# Patient Record
(demographics unavailable — no encounter records)

---

## 2024-10-14 NOTE — REVIEW OF SYSTEMS
[Feeling Fatigued] : not feeling fatigued [SOB] : no shortness of breath [Dyspnea on exertion] : not dyspnea during exertion [Chest Discomfort] : no chest discomfort [Lower Ext Edema] : no extremity edema [Palpitations] : palpitations [Syncope] : no syncope [Dizziness] : no dizziness

## 2024-10-14 NOTE — CARDIOLOGY SUMMARY
[de-identified] : 4/13/2023 normal sinus rhythm  72 bpm, QTc 424 7/6/2023 sinus rhythm 65, poor R wave progression, QTc 404 1/10/2024 Sinus 80 poor R wave progression, QTc 427 4/18/24 Sinus 87, normal axis, low voltage with poor R-wave, QTc 384 10/14/2024: NSR, LAE, QTc 418 [de-identified] : 4/04/2023 at [facility] TTE  LVEF 55%, mild cLVH, trace MR [de-identified] :   4/4/2023 CCTA at [facility]:  normal coronaries, calcium score zero [de-identified] : 4/18/2023 US Duplex Renal: Normal right renal artery, left  renal arteries < 60% stenosis, normal aorta no significant stenosis

## 2024-10-14 NOTE — DISCUSSION/SUMMARY
[FreeTextEntry1] : 53F with a family history of premature CAD (Father CAD in his 40's), history of pre-eclampsia with first pregnancy only, normotensive on 2 subsequent pregnancies, with Hashimoto's, GERD, HLD, presented to [facility] ER on 4/4/2023 with chest tightness and SOB, /112 mmHg. Pt reported worsening exertional dyspnea x 2 months especially with stairs.  Troponin negative x 2, BNP 67, TSH WNL. Pt underwent TTE  LVEF 55%, mild LVH, trace MR, CTA 4/4/2023 revealed normal coronaries, calcium score zero. Discharged on Amlodipine 5 mg daily. BP was poorly controlled, PCP increased Amlodipine to 10 mg, pt developed LE edema, Amlodipine was decreased to 5 mg and Valsartan was increased 320 mg daily, last seen 1/2024 added HCTZ but Na 131 then discontinued and increased amlodipine to 7.5mg, presents for routine follow-up.  Assessment/ Plan: BP mildly elevated today on current agents (Presently taking Amlodipine 5mg daily and Valsartan 320mg daily), no prior LE edema with amlodipine 7.5mg dose, repeat EKG with chronic poor R-wave progression.   1. HTN- continue amlodipine 5mg and valsartan 320mg daily. Advised to restart monitoring BP at home, call if persistently elevated and would increase Amlodipine back to 7.5mg daily. Continue low sodium diet. Encouraged routine exercise and healthy dieting.    2. HLD- LDL 85 in April 2024. On Atorvastatin 40mg daily.   3. Obesity- continue dieting and weight loss  4. Hypothyroid- on levothyroxine  Follow up in 6 months, sooner if needed. Patient was advised to contact the office for any new, worsening or concerning symptoms. Patient verbalized understanding and is in agreement with the above plan.  Rhea Ballesteros was present in office at the time of visit. [EKG obtained to assist in diagnosis and management of assessed problem(s)] : EKG obtained to assist in diagnosis and management of assessed problem(s)

## 2024-10-14 NOTE — HISTORY OF PRESENT ILLNESS
[FreeTextEntry1] : 54F with a family history of premature CAD (Father CAD in his 40's), history of pre-eclampsia with first pregnancy only, normotensive on 2 subsequent pregnancies, with Hashimoto's, GERD, HLD, presented to [facility] ER on 4/4/2023 with chest tightness and SOB, /112 mmHg. Pt reported worsening exertional dyspnea x 2 months especially with stairs.  Troponin negative x 2, BNP 67, TSH WNL. Pt underwent TTE  LVEF 55%, mild LVH, trace MR, CTA 4/4/2023 revealed normal coronaries, calcium score zero. Discharged on Amlodipine 5 mg daily. BP was poorly controlled, PCP increased Amlodipine to 10 mg, pt developed LE edema, Amlodipine was decreased to 5 mg and Valsartan was increased 320 mg daily, last seen 1/2024 added HCTZ but Na 131 then discontinued and increased amlodipine to 7.5mg, presents for routine follow-up.  Reports feeling overall well. Reports occasional palpitations, describes as a fast beat, takes a deep breath and it resolves, often occurring when busy at work. Denies chest pain, dyspnea, lightheadedness, dizziness, fatigue, syncope, near syncope and LE edema. Denies smoking, excessive alcohol and illicit drug use. She walks constantly at work, otherwise not participating in any other routine exercise.   Prior visit 4/18/2024: Reports feeling well, home -120s did not have swelling on amlodipine 7.5mg, when taking HCTZ with SBP 100s made her dizzy, had recent hyperthyroid flare since reduced levothyroxine dose by PCP. Getting PT for joint/neck pain, also pending to see rheumatology.  Works as a teacher walking around constantly.    Prior visit 1/2024:  Pt reports feeling well, however she has gained 10 pounds since last visit in July BP is elevated today, not following at home , she is following a low sodium diet but eating more and eating the wrong foods walks 30 min on the treadmill  2 days week- knows she has to exercise more Asymptomatic- Denies chest pain, SOB/GLEZ, PND, orthopnea, LE edema, palpitations, lightheadedness, syncope Follows with PCP Dr Contreras - TFT's controlled and last checked 2 months ago  Had LE edema with Amlodipine 10 mg, gets intermittent mild Edema after standing all day    Prior visit 7/6/2023 She is feeling well Was getting good readings at home, sometimes thought gets too much anxiety regarding home BP monitoring, SBP up to 150 with anxiety-had to stop monitoring Has not lost weight but walking on the treadmill, and lifting light weights, no longer feeling any GLEZ Denies chest pain, SOB/GLEZ, PND, orthopnea, LE edema, palpitations, lightheadedness, syncope    prior visit  4/27/2023 Pt presents today for f/u of HTN Renal duplex showed no KARINA BP at home has not been optimal, typically 's -159's PCP increase Amlodipine to 10 mg daily,but pt c/o LE edema Noticed BP spike after  a salty meal, still has intermittent headaches and at times noticed GLEZ after stairs- this is when BP is high Denies chest pain, SOB, PND, orthopnea, palpitations, lightheadedness, syncope Had some dizziness until she moved Valsartan to nighttime dosing Pt has purchased a new [employer] BP machine which was checked with PCP and is accurate  PCP has ordered a sleep study She has not started exercising,  joined [employer], wants to lose 30-40 pounds     Prior visit 4/13/2023 Pt presents for hospital discharge follow-up Pt saw PCP  Dr Contreras, 4/6/2023, BP was elevated /100 mmHg, PCP changed Amlodipine 5 mg to Valsartan, no real change in BP, stopped checking BP at home as her BP machine was inaccurate as check by PCP- will be purchasing a new one   Pt reports an episodes of chest tightness while laying in bed on 4/10, lasted a few minutes and resolved with meditation, denies dyspnea . Was away last weekend, ate out- non compliant with low sodium diet,  has been feeling some episodes of lightheadedness,  Reports intermittent headaches since summer 2022, had been caring for her mother with dementia who passed away in February, stopped exercising, gained 20 pounds, in menopause, had slowly been getting more progressively short of breath over several months.  Pt denies any TIA symptoms, denies LE edema, syncope

## 2024-11-11 NOTE — HISTORY OF PRESENT ILLNESS
[FreeTextEntry1] : 53 YO  p2  PT HERE ANNUAL EXAM had atypical polyp colon removed recently, will do year fu.  meds- amlodipine, atorvastatin, vit d , omeprazole, valsartan, levothyroxine hashimotos lmp 2 yrs ago.  p3, 12, 21, 24/  co dyspareunia

## 2025-04-14 NOTE — DISCUSSION/SUMMARY
[FreeTextEntry1] : 54F h/o obesity (BMI 32) and with a family history of premature CAD (Father CAD in his 40's), history of pre-eclampsia with first pregnancy only, normotensive on 2 subsequent pregnancies, with Hashimoto's, GERD, HLD, presented to [facility] ER on 4/4/2023 with chest tightness and SOB, /112 mmHg. Pt reported worsening exertional dyspnea x 2 months especially with stairs.  Troponin negative x 2, BNP 67, TSH WNL. Pt underwent TTE  LVEF 55%, mild LVH, trace MR, CTA 4/4/2023 revealed normal coronaries, calcium score zero. Discharged on Amlodipine 5 mg daily. BP was poorly controlled, PCP increased Amlodipine to 10 mg, pt developed LE edema, Amlodipine was decreased to 5 mg and Valsartan was increased 320 mg daily, prior visit 1/2024 added HCTZ but Na 131 then discontinued and increased amlodipine to 7.5mg, last seen 10/2024, presents for routine follow-up.  BP not at goal advised to increase amlodipine to 7.5mg if still not achieving <140/90 then increase further to 10mg, continue valsartan, repeat EKG normal and no anginal complains. Needs weight loss unable to do cardio exercise due to chronic joints pain, obtain sleep study to confirm if KRISTEN would be indicated for Zepbound use.   1. HTN- increase amlodipine to 7.5mg and valsartan 320mg daily.  Continue low sodium diet. Encouraged routine exercise and healthy dieting.    2. HLD- LDL 85 in April 2024. On Atorvastatin 40mg every other day due to joints pain, consider alternative statin if still bothersome.   3. Obesity- continue dieting and weight loss. Pending sleep study for suspected sleep apnea.  4. Hypothyroid- on levothyroxine   Follow up in 6 months. [EKG obtained to assist in diagnosis and management of assessed problem(s)] : EKG obtained to assist in diagnosis and management of assessed problem(s)

## 2025-04-14 NOTE — HISTORY OF PRESENT ILLNESS
[FreeTextEntry1] : 54F h/o obesity (BMI 32) and with a family history of premature CAD (Father CAD in his 40's), history of pre-eclampsia with first pregnancy only, normotensive on 2 subsequent pregnancies, with Hashimoto's, GERD, HLD, presented to [facility] ER on 4/4/2023 with chest tightness and SOB, /112 mmHg. Pt reported worsening exertional dyspnea x 2 months especially with stairs.  Troponin negative x 2, BNP 67, TSH WNL. Pt underwent TTE  LVEF 55%, mild LVH, trace MR, CTA 4/4/2023 revealed normal coronaries, calcium score zero. Discharged on Amlodipine 5 mg daily. BP was poorly controlled, PCP increased Amlodipine to 10 mg, pt developed LE edema, Amlodipine was decreased to 5 mg and Valsartan was increased 320 mg daily, prior visit 1/2024 added HCTZ but Na 131 then discontinued and increased amlodipine to 7.5mg, last seen 10/2024, presents for routine follow-up.  Reports feeling anxious at times as with home /90s but did not increase amlodipine to 7.5mg still taking 5mg dosing, frustrated about not able to lose weight but not doing cardio exercise due to chronic joints pain, taking atorvastatin every other day, had lab work done >6 months ago, waking up feeling exhausted everyday and told by her  with snoring.    Prior visit 10/2024: Reports feeling overall well. Reports occasional palpitations, describes as a fast beat, takes a deep breath and it resolves, often occurring when busy at work. Denies chest pain, dyspnea, lightheadedness, dizziness, fatigue, syncope, near syncope and LE edema. Denies smoking, excessive alcohol and illicit drug use. She walks constantly at work, otherwise not participating in any other routine exercise.   Prior visit 4/18/2024: Reports feeling well, home -120s did not have swelling on amlodipine 7.5mg, when taking HCTZ with SBP 100s made her dizzy, had recent hyperthyroid flare since reduced levothyroxine dose by PCP. Getting PT for joint/neck pain, also pending to see rheumatology.  Works as a teacher walking around constantly.    Prior visit 1/2024:  Pt reports feeling well, however she has gained 10 pounds since last visit in July BP is elevated today, not following at home , she is following a low sodium diet but eating more and eating the wrong foods walks 30 min on the treadmill  2 days week- knows she has to exercise more Asymptomatic- Denies chest pain, SOB/GLEZ, PND, orthopnea, LE edema, palpitations, lightheadedness, syncope Follows with PCP Dr Contreras - TFT's controlled and last checked 2 months ago  Had LE edema with Amlodipine 10 mg, gets intermittent mild Edema after standing all day    Prior visit 7/6/2023 She is feeling well Was getting good readings at home, sometimes thought gets too much anxiety regarding home BP monitoring, SBP up to 150 with anxiety-had to stop monitoring Has not lost weight but walking on the treadmill, and lifting light weights, no longer feeling any GLEZ Denies chest pain, SOB/GLEZ, PND, orthopnea, LE edema, palpitations, lightheadedness, syncope    prior visit  4/27/2023 Pt presents today for f/u of HTN Renal duplex showed no KARINA BP at home has not been optimal, typically 's -159's PCP increase Amlodipine to 10 mg daily,but pt c/o LE edema Noticed BP spike after  a salty meal, still has intermittent headaches and at times noticed GLEZ after stairs- this is when BP is high Denies chest pain, SOB, PND, orthopnea, palpitations, lightheadedness, syncope Had some dizziness until she moved Valsartan to nighttime dosing Pt has purchased a new [employer] BP machine which was checked with PCP and is accurate  PCP has ordered a sleep study She has not started exercising,  joined [employer], wants to lose 30-40 pounds     Prior visit 4/13/2023 Pt presents for hospital discharge follow-up Pt saw PCP  Dr Contreras, 4/6/2023, BP was elevated /100 mmHg, PCP changed Amlodipine 5 mg to Valsartan, no real change in BP, stopped checking BP at home as her BP machine was inaccurate as check by PCP- will be purchasing a new one   Pt reports an episodes of chest tightness while laying in bed on 4/10, lasted a few minutes and resolved with meditation, denies dyspnea . Was away last weekend, ate out- non compliant with low sodium diet,  has been feeling some episodes of lightheadedness,  Reports intermittent headaches since summer 2022, had been caring for her mother with dementia who passed away in February, stopped exercising, gained 20 pounds, in menopause, had slowly been getting more progressively short of breath over several months.  Pt denies any TIA symptoms, denies LE edema, syncope

## 2025-04-14 NOTE — DISCUSSION/SUMMARY
[FreeTextEntry1] : 54F h/o obesity (BMI 32) and with a family history of premature CAD (Father CAD in his 40's), history of pre-eclampsia with first pregnancy only, normotensive on 2 subsequent pregnancies, with Hashimoto's, GERD, HLD, presented to [facility] ER on 4/4/2023 with chest tightness and SOB, /112 mmHg. Pt reported worsening exertional dyspnea x 2 months especially with stairs.  Troponin negative x 2, BNP 67, TSH WNL. Pt underwent TTE  LVEF 55%, mild LVH, trace MR, CTA 4/4/2023 revealed normal coronaries, calcium score zero. Discharged on Amlodipine 5 mg daily. BP was poorly controlled, PCP increased Amlodipine to 10 mg, pt developed LE edema, Amlodipine was decreased to 5 mg and Valsartan was increased 320 mg daily, prior visit 1/2024 added HCTZ but Na 131 then discontinued and increased amlodipine to 7.5mg, last seen 10/2024, presents for routine follow-up.  BP not at goal advised to increase amlodipine to 7.5mg if still not achieving <140/90 then increase further to 10mg, continue valsartan, repeat EKG normal and no anginal complains. Needs weight loss unable to do cardio exercise due to chronic joints pain, obtain sleep study to confirm if KRISTEN would be indicated for Zepbound use.   1. HTN- increase amlodipine to 7.5mg and valsartan 320mg daily.  Continue low sodium diet. Encouraged routine exercise and healthy dieting.    2. HLD- LDL 85 in April 2024. On Atorvastatin 40mg every other day due to joints pain, consider alternative statin if still bothersome.   3. Obesity- continue dieting and weight loss. Pending sleep study for suspected sleep apnea.  4. Hypothyroid- on levothyroxine   Follow up in 6 months. [EKG obtained to assist in diagnosis and management of assessed problem(s)] : EKG obtained to assist in diagnosis and management of assessed problem(s)

## 2025-04-14 NOTE — CARDIOLOGY SUMMARY
[de-identified] : 4/13/2023 normal sinus rhythm  72 bpm, QTc 424 7/6/2023 sinus rhythm 65, poor R wave progression, QTc 404 1/10/2024 Sinus 80 poor R wave progression, QTc 427 4/18/24 Sinus 87, normal axis, low voltage with poor R-wave, QTc 384 10/14/2024: NSR, LAE, QTc 418 4/14/25: Sinus 77 normal axis, no ST changes, QTc 433 [de-identified] : 4/04/2023 at [facility] TTE  LVEF 55%, mild cLVH, trace MR [de-identified] :   4/4/2023 CCTA at [facility]:  normal coronaries, calcium score zero [de-identified] : 4/18/2023 US Duplex Renal: Normal right renal artery, left  renal arteries < 60% stenosis, normal aorta no significant stenosis

## 2025-04-14 NOTE — HISTORY OF PRESENT ILLNESS
[FreeTextEntry1] : 54F h/o obesity (BMI 32) and with a family history of premature CAD (Father CAD in his 40's), history of pre-eclampsia with first pregnancy only, normotensive on 2 subsequent pregnancies, with Hashimoto's, GERD, HLD, presented to [facility] ER on 4/4/2023 with chest tightness and SOB, /112 mmHg. Pt reported worsening exertional dyspnea x 2 months especially with stairs.  Troponin negative x 2, BNP 67, TSH WNL. Pt underwent TTE  LVEF 55%, mild LVH, trace MR, CTA 4/4/2023 revealed normal coronaries, calcium score zero. Discharged on Amlodipine 5 mg daily. BP was poorly controlled, PCP increased Amlodipine to 10 mg, pt developed LE edema, Amlodipine was decreased to 5 mg and Valsartan was increased 320 mg daily, prior visit 1/2024 added HCTZ but Na 131 then discontinued and increased amlodipine to 7.5mg, last seen 10/2024, presents for routine follow-up.  Reports feeling anxious at times as with home /90s but did not increase amlodipine to 7.5mg still taking 5mg dosing, frustrated about not able to lose weight but not doing cardio exercise due to chronic joints pain, taking atorvastatin every other day, had lab work done >6 months ago, waking up feeling exhausted everyday and told by her  with snoring.    Prior visit 10/2024: Reports feeling overall well. Reports occasional palpitations, describes as a fast beat, takes a deep breath and it resolves, often occurring when busy at work. Denies chest pain, dyspnea, lightheadedness, dizziness, fatigue, syncope, near syncope and LE edema. Denies smoking, excessive alcohol and illicit drug use. She walks constantly at work, otherwise not participating in any other routine exercise.   Prior visit 4/18/2024: Reports feeling well, home -120s did not have swelling on amlodipine 7.5mg, when taking HCTZ with SBP 100s made her dizzy, had recent hyperthyroid flare since reduced levothyroxine dose by PCP. Getting PT for joint/neck pain, also pending to see rheumatology.  Works as a teacher walking around constantly.    Prior visit 1/2024:  Pt reports feeling well, however she has gained 10 pounds since last visit in July BP is elevated today, not following at home , she is following a low sodium diet but eating more and eating the wrong foods walks 30 min on the treadmill  2 days week- knows she has to exercise more Asymptomatic- Denies chest pain, SOB/GLEZ, PND, orthopnea, LE edema, palpitations, lightheadedness, syncope Follows with PCP Dr Contreras - TFT's controlled and last checked 2 months ago  Had LE edema with Amlodipine 10 mg, gets intermittent mild Edema after standing all day    Prior visit 7/6/2023 She is feeling well Was getting good readings at home, sometimes thought gets too much anxiety regarding home BP monitoring, SBP up to 150 with anxiety-had to stop monitoring Has not lost weight but walking on the treadmill, and lifting light weights, no longer feeling any GLEZ Denies chest pain, SOB/GLEZ, PND, orthopnea, LE edema, palpitations, lightheadedness, syncope    prior visit  4/27/2023 Pt presents today for f/u of HTN Renal duplex showed no KARINA BP at home has not been optimal, typically 's -159's PCP increase Amlodipine to 10 mg daily,but pt c/o LE edema Noticed BP spike after  a salty meal, still has intermittent headaches and at times noticed GLEZ after stairs- this is when BP is high Denies chest pain, SOB, PND, orthopnea, palpitations, lightheadedness, syncope Had some dizziness until she moved Valsartan to nighttime dosing Pt has purchased a new [employer] BP machine which was checked with PCP and is accurate  PCP has ordered a sleep study She has not started exercising,  joined [employer], wants to lose 30-40 pounds     Prior visit 4/13/2023 Pt presents for hospital discharge follow-up Pt saw PCP  Dr Contreras, 4/6/2023, BP was elevated /100 mmHg, PCP changed Amlodipine 5 mg to Valsartan, no real change in BP, stopped checking BP at home as her BP machine was inaccurate as check by PCP- will be purchasing a new one   Pt reports an episodes of chest tightness while laying in bed on 4/10, lasted a few minutes and resolved with meditation, denies dyspnea . Was away last weekend, ate out- non compliant with low sodium diet,  has been feeling some episodes of lightheadedness,  Reports intermittent headaches since summer 2022, had been caring for her mother with dementia who passed away in February, stopped exercising, gained 20 pounds, in menopause, had slowly been getting more progressively short of breath over several months.  Pt denies any TIA symptoms, denies LE edema, syncope

## 2025-04-14 NOTE — CARDIOLOGY SUMMARY
[de-identified] : 4/13/2023 normal sinus rhythm  72 bpm, QTc 424 7/6/2023 sinus rhythm 65, poor R wave progression, QTc 404 1/10/2024 Sinus 80 poor R wave progression, QTc 427 4/18/24 Sinus 87, normal axis, low voltage with poor R-wave, QTc 384 10/14/2024: NSR, LAE, QTc 418 4/14/25: Sinus 77 normal axis, no ST changes, QTc 433 [de-identified] : 4/04/2023 at [facility] TTE  LVEF 55%, mild cLVH, trace MR [de-identified] :   4/4/2023 CCTA at [facility]:  normal coronaries, calcium score zero [de-identified] : 4/18/2023 US Duplex Renal: Normal right renal artery, left  renal arteries < 60% stenosis, normal aorta no significant stenosis

## 2025-06-26 NOTE — RESULTS/DATA
[TextEntry] : Home PSG from 5/1/25 revealed moderate KRISTEN with an AHI of 27.7 though severe in REM sleep.

## 2025-06-26 NOTE — CONSULT LETTER
[Dear  ___] : Dear  [unfilled], [Consult Letter:] : I had the pleasure of evaluating your patient, [unfilled]. [Please see my note below.] : Please see my note below. [Consult Closing:] : Thank you very much for allowing me to participate in the care of this patient.  If you have any questions, please do not hesitate to contact me. [Sincerely,] : Sincerely, [FreeTextEntry3] : Dawson Rothman MD, FCCP, D. ABS[facility] Sleep [facility] at [place]

## 2025-06-26 NOTE — PHYSICAL EXAM
[No Acute Distress] : no acute distress [Normal Appearance] : normal appearance [Supple] : supple [Normal Rate/Rhythm] : normal rate/rhythm [Normal S1, S2] : normal s1, s2 [No Murmurs] : no murmurs [No Resp Distress] : no resp distress [No Acc Muscle Use] : no acc muscle use [Normal Rhythm and Effort] : normal rhythm and effort [Clear to Auscultation Bilaterally] : clear to auscultation bilaterally [No Abnormalities] : no abnormalities [Benign] : benign [Not Tender] : not tender [Soft] : soft [No Clubbing] : no clubbing [No Edema] : no edema [No Focal Deficits] : no focal deficits [Oriented x3] : oriented x3 [Normal Oropharynx] : normal oropharynx

## 2025-06-26 NOTE — REVIEW OF SYSTEMS
[Fatigue] : fatigue [Dry Mouth] : dry mouth [SOB on Exertion] : sob on exertion [Palpitations] : palpitations [GERD] : gerd [Back Pain] : back pain [Headache] : headache [Anxiety] : anxiety [Thyroid Problem] : thyroid problem [Obesity] : obesity [Negative] : Hematologic

## 2025-06-26 NOTE — DISCUSSION/SUMMARY
[FreeTextEntry1] : #1. Will schedule lung function testing in near future to assess lung function. #2. The patient does not appear to require chronic BD therapy at this time. #3. Diet and exercise for weight loss. #4. SOBOE is likely at least somewhat related to weight or deconditioning. #5. CXR to evaluate SOBOE. #6. Start autoCPAP to treat moderate KRISTEN with an AHI of 27.7 though severe in REM sleep; encouraged at least 70% compliance. #7. Replace PAP equipment as needed; ordered machine and supplies 6/26/25. #8. S/p Covid vaccines and infection. #9. Never smoker. #10. F/u in 8 weeks with compliance, andrea, and CXR.   The patient expressed understanding and agreement with the above recommendations/plan and accepts responsibility to be compliant with recommended testing, therapies, and f/u visits. All relevant questions and concerns were addressed.

## 2025-06-26 NOTE — REASON FOR VISIT
[Initial] : an initial visit [Sleep Apnea] : sleep apnea [Shortness of Breath] : shortness of breath [Obesity] : obesity [TextBox_44] : prior Covid infection

## 2025-06-26 NOTE — END OF VISIT
[Time Spent: ___ minutes] : I have spent [unfilled] minutes of time on the encounter which excludes teaching and separately reported services. [TextEntry] : Discussed with pt at length regarding KRISTEN, obesity, soboe, prior Covid infection; reviewed w/u with pt as above.

## 2025-06-26 NOTE — HISTORY OF PRESENT ILLNESS
[Snoring] : snoring [Excessive Daytime Sleepiness] : excessive daytime sleepiness [Witnessed Apnea During Sleep] : witnessed apnea during sleep [Witnessed Gasping During Sleep] : witnessed gasping during sleep [Unrefreshing Sleep] : unrefreshing sleep [Sleepy When Sedentary] : sleepy when sedentary [Morning Headaches] : morning headaches [Dry Throat] : dry throat [Initial Evaluation] : an initial evaluation of [Excess Weight] : excess weight [Currently Experiencing] : The patient is currently experiencing symptoms. [Fatigue] : fatigue [Knee Pain] : knee pain [Back Pain] : back pain [Joint Pain] : joint pain [Low Calorie Diet] : low calorie diet [Fair Compliance] : fair compliance with treatment [Fair Tolerance] : fair tolerance of treatment [Poor Symptom Control] : poor symptom control [Glucose Intolerance] : glucose intolerance [Sleep Apnea] : sleep apnea [Hypertension] : hypertension [High] : high [Low Calorie] : low calorie [Well Balanced Diet] : well balanced meals [None] : The patient does not exercise [TextBox_4] : Never smoker. S/p Covid infection with mild symptoms. Follows with cardiologist for HTN. Pt reports 15 lbs in the past year with worsening sleep symptoms. Patient c/o SOBOE but is otherwise without associated respiratory complaints.  [TextBox_11] : 5 [ESS] : 10